# Patient Record
Sex: MALE | Race: WHITE | NOT HISPANIC OR LATINO | Employment: OTHER | URBAN - METROPOLITAN AREA
[De-identification: names, ages, dates, MRNs, and addresses within clinical notes are randomized per-mention and may not be internally consistent; named-entity substitution may affect disease eponyms.]

---

## 2017-02-10 ENCOUNTER — OFFICE VISIT (OUTPATIENT)
Dept: PULMONOLOGY | Facility: CLINIC | Age: 77
End: 2017-02-10
Payer: MEDICARE

## 2017-02-10 ENCOUNTER — TELEPHONE (OUTPATIENT)
Dept: PULMONOLOGY | Facility: CLINIC | Age: 77
End: 2017-02-10

## 2017-02-10 ENCOUNTER — HOSPITAL ENCOUNTER (OUTPATIENT)
Dept: RADIOLOGY | Facility: HOSPITAL | Age: 77
Discharge: HOME | End: 2017-02-10
Attending: INTERNAL MEDICINE
Payer: MEDICARE

## 2017-02-10 VITALS
WEIGHT: 152 LBS | OXYGEN SATURATION: 87 % | BODY MASS INDEX: 21.76 KG/M2 | HEIGHT: 70 IN | HEART RATE: 90 BPM | SYSTOLIC BLOOD PRESSURE: 115 MMHG | DIASTOLIC BLOOD PRESSURE: 78 MMHG

## 2017-02-10 DIAGNOSIS — J96.11 CHRONIC RESPIRATORY FAILURE WITH HYPOXIA: ICD-10-CM

## 2017-02-10 DIAGNOSIS — J84.10 PULMONARY FIBROSIS: ICD-10-CM

## 2017-02-10 DIAGNOSIS — K59.03 DRUG-INDUCED CONSTIPATION: Primary | ICD-10-CM

## 2017-02-10 DIAGNOSIS — J47.9 BRONCHIECTASIS WITHOUT COMPLICATION: ICD-10-CM

## 2017-02-10 DIAGNOSIS — S22.000A CLOSED COMPRESSION FRACTURE OF THORACIC VERTEBRA, INITIAL ENCOUNTER: ICD-10-CM

## 2017-02-10 PROCEDURE — 99999 PR PBB SHADOW E&M-EST. PATIENT-LVL IV: CPT | Mod: PBBFAC,,, | Performed by: INTERNAL MEDICINE

## 2017-02-10 PROCEDURE — 71020 XR CHEST PA AND LATERAL: CPT | Mod: TC

## 2017-02-10 PROCEDURE — 99214 OFFICE O/P EST MOD 30 MIN: CPT | Mod: PBBFAC,PO | Performed by: INTERNAL MEDICINE

## 2017-02-10 PROCEDURE — 99214 OFFICE O/P EST MOD 30 MIN: CPT | Mod: S$PBB,,, | Performed by: INTERNAL MEDICINE

## 2017-02-10 PROCEDURE — 71020 XR CHEST PA AND LATERAL: CPT | Mod: 26,,, | Performed by: RADIOLOGY

## 2017-02-10 RX ORDER — LACTULOSE 10 G/15ML
10 SOLUTION ORAL 2 TIMES DAILY
Qty: 300 ML | Refills: 11 | Status: SHIPPED | OUTPATIENT
Start: 2017-02-10 | End: 2017-02-20

## 2017-02-10 RX ORDER — HYDROCODONE BITARTRATE AND ACETAMINOPHEN 5; 325 MG/1; MG/1
1 TABLET ORAL EVERY 6 HOURS PRN
Qty: 100 TABLET | Refills: 0 | Status: SHIPPED | OUTPATIENT
Start: 2017-02-10

## 2017-02-10 RX ORDER — POLYETHYLENE GLYCOL 3350 17 G/17G
17 POWDER, FOR SOLUTION ORAL DAILY
Qty: 30 PACKET | Refills: 11 | Status: SHIPPED | OUTPATIENT
Start: 2017-02-10

## 2017-02-10 RX ORDER — MORPHINE SULFATE 15 MG/1
15 TABLET ORAL EVERY 4 HOURS PRN
Qty: 90 TABLET | Refills: 0 | Status: SHIPPED | OUTPATIENT
Start: 2017-02-10

## 2017-02-10 RX ORDER — MORPHINE SULFATE 30 MG/1
30 CAPSULE, EXTENDED RELEASE ORAL DAILY
Qty: 30 CAPSULE | Refills: 0 | Status: SHIPPED | OUTPATIENT
Start: 2017-02-10

## 2017-02-10 RX ORDER — ALBUTEROL SULFATE 90 UG/1
AEROSOL, METERED RESPIRATORY (INHALATION)
Qty: 1 INHALER | Refills: 11 | Status: SHIPPED | OUTPATIENT
Start: 2017-02-10

## 2017-02-10 RX ORDER — LUBIPROSTONE 24 UG/1
24 CAPSULE ORAL 2 TIMES DAILY WITH MEALS
Qty: 60 CAPSULE | Refills: 2 | Status: SHIPPED | OUTPATIENT
Start: 2017-02-10

## 2017-02-10 NOTE — PROGRESS NOTES
"2/10/2017    Pablito Ferrer  Office Note    Chief Complaint   Patient presents with    Follow-up       Feb 10, 2017, has extremis with with bm with sats very low, needs to use niv for bm.  Very constipated. Uses laxatives.  High dahlia o2 and niv for extreme sob/air hunger. Uses morphine once daily with occasional extra later with great results and no apparent side effects.   No recent weight.         Nov 10, 2016HPI: doing well with steroid and morphine, uses 02 24/7- 7-9 lpm.  Uses morphine and has good response- 15mg msir in am allows pt to get out of bed, later in day doesn't do as well.  Uses narco some days 1- 2 days, bowels are good with occ stool softener need.  Compression fx pain lingering and needs pain rx- pain may be worsening- pain 5-6/10.  Cough moderate but not violent. Mucous cream colored- no abx recent.  Has sq cell resect form lip.  Fluid retention with need for diuretic every other day.        The chief compliant  problem is "stable but needs high o2 and narcotics.  PFSH:  Past Medical History   Diagnosis Date    Acute exacerbation of bronchiectasis 7/15/2016    Chronic bronchitis     Coronary artery disease     Pneumonia     Pulmonary fibrosis     Rash          Past Surgical History   Procedure Laterality Date    Coronary artery bypass graft      Cardiac catheterization      Inguinal hernia repair Bilateral 2015    Hand surgery Right 1985     Social History   Substance Use Topics    Smoking status: Former Smoker     Types: Pipe    Smokeless tobacco: Never Used    Alcohol use No     Family History   Problem Relation Age of Onset    Cancer Mother     Heart failure Father     Cancer Sister     No Known Problems Brother     No Known Problems Maternal Aunt     No Known Problems Maternal Uncle     No Known Problems Paternal Aunt     No Known Problems Paternal Uncle     Heart failure Maternal Grandmother     Heart failure Maternal Grandfather     No Known Problems Paternal " "Grandmother     No Known Problems Paternal Grandfather      Review of patient's allergies indicates:   Allergen Reactions    Isosorbide Other (See Comments)     headache  headache    Isosorbide mononitrate Other (See Comments)     headache       Performance Status:The patient's activity level is mobility with asit devices.      Review of Systems:  a review of eleven systems covering constitutional, Eye, HEENT, Psych, Respiratory, Cardiac, GI, , Musculoskeletal, Endocrine, Dermatologic was negative except for pertinent findings as listed ABOVE and below:  All good except as above.     Exam:Comprehensive exam done.   Visit Vitals    /78 (BP Location: Right arm, Patient Position: Sitting, BP Method: Automatic)    Pulse 90    Ht 5' 10" (1.778 m)    Wt 68.9 kg (152 lb)    SpO2 (!) 87%    BMI 21.81 kg/m2     Exam included Vitals as listed, and patient's appearance and affect and alertness and mood, oral exam for yeast and hygiene and pharynx lesions and Mallapatti (M) score, neck with inspection for jvd and masses and thyroid abnormalities and lymph nodes (supraclavicular and infraclavicular nodes and axillary also examined and noted if abn), chest exam included symmetry and effort and fremitus and percussion and auscultation, cardiac exam included rhythm and gallops and murmur and rubs and jvd and edema, abdominal exam for mass and hepatosplenomegaly and tenderness and hernias and bowel sounds, Musculoskeletal exam with muscle tone and posture and mobility/gait and  strength, and skin for rashes and cyanosis and pallor and turgor, extremity for clubbing.  Findings were normal except for pertinent findings listed below:  M2 and posterior rales 2/3 up, rale below clavicles anterior.  No edema, freq cough.  Looks much worse    Radiographs (ct chest and cxr) reviewed: cxr with much worse fibrosis    Labs reviewed with no issues.           Plan:  Clinical impression is resonably certain and repeated " evaluation prn +/- follow up will be needed as below.    Pablito was seen today for follow-up.    Diagnoses and all orders for this visit:    Drug-induced constipation  -     polyethylene glycol (GLYCOLAX) 17 gram PwPk; Take 17 g by mouth once daily.  -     lactulose (CHRONULAC) 20 gram/30 mL Soln; Take 15 mLs (10 g total) by mouth 2 (two) times daily.  -     lubiprostone (AMITIZA) 24 MCG Cap; Take 1 capsule (24 mcg total) by mouth 2 (two) times daily with meals.    Pulmonary fibrosis  -     nintedanib (OFEV) 100 mg Cap; Take 1 capsule by mouth 2 (two) times daily.  -     morphine (MSIR) 15 MG tablet; Take 1 tablet (15 mg total) by mouth every 4 (four) hours as needed for Pain (breathlessness).  -     hydrocodone-acetaminophen 5-325mg (NORCO) 5-325 mg per tablet; Take 1 tablet by mouth every 6 (six) hours as needed (cough or pain or shortness of breath.).  -     polyethylene glycol (GLYCOLAX) 17 gram PwPk; Take 17 g by mouth once daily.  -     lactulose (CHRONULAC) 20 gram/30 mL Soln; Take 15 mLs (10 g total) by mouth 2 (two) times daily.  -     morphine (AVINZA) 30 MG 24 hr capsule; Take 1 capsule (30 mg total) by mouth once daily.    Closed compression fracture of thoracic vertebra, initial encounter  -     morphine (MSIR) 15 MG tablet; Take 1 tablet (15 mg total) by mouth every 4 (four) hours as needed for Pain (breathlessness).  -     hydrocodone-acetaminophen 5-325mg (NORCO) 5-325 mg per tablet; Take 1 tablet by mouth every 6 (six) hours as needed (cough or pain or shortness of breath.).  -     morphine (AVINZA) 30 MG 24 hr capsule; Take 1 capsule (30 mg total) by mouth once daily.    Bronchiectasis without complication  -     morphine (MSIR) 15 MG tablet; Take 1 tablet (15 mg total) by mouth every 4 (four) hours as needed for Pain (breathlessness).  -     hydrocodone-acetaminophen 5-325mg (NORCO) 5-325 mg per tablet; Take 1 tablet by mouth every 6 (six) hours as needed (cough or pain or shortness of breath.).  -      albuterol 90 mcg/actuation inhaler; 2 puffs every 4 hours as needed for cough, wheeze, or shortness of breath  unable to take fosamax solution.    No Follow-up on file.    Discussed with patient above for education the following:       May need to call to arrange morphine and narco.    levaquin for yg mucous.    May need to consider more ofev dose next visit.    Check blood now.    Check xray and blood 3 months.

## 2017-02-10 NOTE — PATIENT INSTRUCTIONS
Fibrosis is worse and would be best managed by staying home with morphine.    24 hr morphine daily at 30 mg and use quick acting as needed. May use norco also.    For constipation use amitizma twice or once a day, use lactulose and miralax.  Will need more narcotics and more laxative.    oximizer pendant would help.     Hospice may be good to assure will get narcotics and therapy adjusted as needed.      Follow by phone otherwise.

## 2017-02-10 NOTE — TELEPHONE ENCOUNTER
----- Message from Akanksha Mcelroy sent at 2/10/2017  1:59 PM CST -----  Contact: Alirio Amezquita with [facility] [phone] is calling to check the quantity of the Lactulose/please advise

## 2017-02-10 NOTE — MR AVS SNAPSHOT
[facility]  [address]  [place] [address]  Phone: [phone]                  Pablito Ferrer   2/10/2017 1:00 PM   Office Visit    Description:  Male : 1940   Provider:  Barrie Bowles MD   Department:  [facility]           Reason for Visit     Follow-up           Diagnoses this Visit        Comments    Drug-induced constipation    -  Primary     Pulmonary fibrosis         Closed compression fracture of thoracic vertebra, initial encounter         Bronchiectasis without complication                To Do List           Goals (5 Years of Data)     None      Follow-Up and Disposition     Return if symptoms worsen or fail to improve.    Follow-up and Disposition History       These Medications        Disp Refills Start End    nintedanib (OFEV) 100 mg Cap 60 capsule 11 2/10/2017     Take 1 capsule by mouth 2 (two) times daily. - Oral    [facility] [address] [place] [address] [facility] [address] [facility] Ph #: [phone]       morphine (MSIR) 15 MG tablet 90 tablet 0 2/10/2017     Take 1 tablet (15 mg total) by mouth every 4 (four) hours as needed for Pain (breathlessness). - Oral    Pharmacy: [facility] [address] [place] [address] [address] AT [facility] Ph #: [phone]       hydrocodone-acetaminophen 5-325mg (NORCO) 5-325 mg per tablet 100 tablet 0 2/10/2017     Take 1 tablet by mouth every 6 (six) hours as needed (cough or pain or shortness of breath.). - Oral    Pharmacy: [facility] [address] [place] [address] [facility] [address] [facility] Ph #: [phone]       polyethylene glycol (GLYCOLAX) 17 gram PwPk 30 packet 11 2/10/2017     Take 17 g by mouth once daily. - Oral    Pharmacy: [facility] [address] [place] [address] [facility] [address] [facility] Ph #: [phone]       lactulose (CHRONULAC) 20 gram/30 mL  Soln 300 mL 11 2/10/2017 2/20/2017    Take 15 mLs (10 g total) by mouth 2 (two) times daily. - Oral    Pharmacy: [facility] [address] [place] [address] Ph #: [phone]       lubiprostone (AMITIZA) 24 MCG Cap 60 capsule 2 2/10/2017     Take 1 capsule (24 mcg total) by mouth 2 (two) times daily with meals. - Oral    Pharmacy: [facility] [address] [place] [address] Ph #: [phone]       morphine (AVINZA) 30 MG 24 hr capsule 30 capsule 0 2/10/2017     Take 1 capsule (30 mg total) by mouth once daily. - Oral    Pharmacy: [facility] [address] [place] [address] Ph #: [phone]       albuterol 90 mcg/actuation inhaler 1 Inhaler 11 2/10/2017     2 puffs every 4 hours as needed for cough, wheeze, or shortness of breath    Pharmacy: [facility] [address] [place] [address] Ph #: [phone]         [facility]     [facility] - 24/7 Assistance  Registered nurses in the [facility] provide clinical advisement, health education, appointment booking, and other advisory services.  Call for this free service at [phone].             Medications           Message regarding Medications     Verify the changes and/or additions to your medication regime listed below are the same as discussed with your clinician today.  If any of these changes or additions are incorrect, please notify your healthcare provider.        START taking these NEW medications        Refills    polyethylene glycol (GLYCOLAX) 17 gram PwPk 11    Sig: Take 17 g by mouth once daily.    Class: Normal    Route: Oral    lactulose (CHRONULAC) 20 gram/30 mL Soln 11    Sig: Take 15 mLs (10 g total) by mouth 2 (two) times daily.    Class: Normal    Route: Oral    lubiprostone (AMITIZA) 24  MCG Cap 2    Sig: Take 1 capsule (24 mcg total) by mouth 2 (two) times daily with meals.    Class: Normal    Route: Oral    morphine (AVINZA) 30 MG 24 hr capsule 0    Sig: Take 1 capsule (30 mg total) by mouth once daily.    Class: Print    Route: Oral    albuterol 90 mcg/actuation inhaler 11    Si puffs every 4 hours as needed for cough, wheeze, or shortness of breath    Class: Print      CHANGE how you are taking these medications     Start Taking Instead of    morphine (MSIR) 15 MG tablet morphine (MSIR) 15 MG tablet    Dosage:  Take 1 tablet (15 mg total) by mouth every 4 (four) hours as needed for Pain (breathlessness). Dosage:  Take 1 tablet (15 mg total) by mouth 2 (two) times daily as needed for Pain (breathlessness).    Reason for Change:  Reorder       STOP taking these medications     clopidogrel (PLAVIX) 75 mg tablet Take 75 mg by mouth once daily.    cycloSPORINE (RESTASIS) 0.05 % ophthalmic emulsion Place 0.4 mLs (1 drop total) into both eyes 2 (two) times daily.    mupirocin (BACTROBAN) 2 % ointment 2 Tubes by Nasal route as needed.           Verify that the below list of medications is an accurate representation of the medications you are currently taking.  If none reported, the list may be blank. If incorrect, please contact your healthcare provider. Carry this list with you in case of emergency.           Current Medications     albuterol (PROVENTIL) 2.5 mg /3 mL (0.083 %) nebulizer solution Take 2.5 mg by nebulization 4 (four) times daily as needed.    albuterol 90 mcg/actuation inhaler 2 puffs every 4 hours as needed for cough, wheeze, or shortness of breath    alendronate (FOSAMAX) 70 MG tablet Take 1 tablet (70 mg total) by mouth every 7 days.    alprazolam (XANAX) 0.25 MG tablet Take 0.25 mg by mouth as needed.    furosemide (LASIX) 40 MG tablet Take 40 mg by mouth once daily.    gabapentin (NEURONTIN) 300 MG capsule Take 1 capsule (300 mg total) by mouth every evening.     "hydrocodone-acetaminophen 5-325mg (NORCO) 5-325 mg per tablet Take 1 tablet by mouth every 6 (six) hours as needed (cough or pain or shortness of breath.).    lactulose (CHRONULAC) 20 gram/30 mL Soln Take 15 mLs (10 g total) by mouth 2 (two) times daily.    lubiprostone (AMITIZA) 24 MCG Cap Take 1 capsule (24 mcg total) by mouth 2 (two) times daily with meals.    metoprolol succinate (TOPROL-XL) 25 MG 24 hr tablet Take 0.5 tablets (12.5 mg total) by mouth once daily.    morphine (AVINZA) 30 MG 24 hr capsule Take 1 capsule (30 mg total) by mouth once daily.    morphine (MSIR) 15 MG tablet Take 1 tablet (15 mg total) by mouth every 4 (four) hours as needed for Pain (breathlessness).    nintedanib (OFEV) 100 mg Cap Take 1 capsule by mouth 2 (two) times daily.    OXYGEN-AIR DELIVERY SYSTEMS MISC 10 L/min by Nasal route.    pantoprazole (PROTONIX) 40 MG tablet Take 1 tablet (40 mg total) by mouth once daily.    polyethylene glycol (GLYCOLAX) 17 gram PwPk Take 17 g by mouth once daily.    predniSONE (DELTASONE) 10 MG tablet Take 10 mg by mouth once daily.           Clinical Reference Information           Your Vitals Were     BP Pulse Height Weight SpO2 BMI    115/78 (BP Location: Right arm, Patient Position: Sitting, BP Method: Automatic) 90 5' 10" (1.778 m) 68.9 kg (152 lb) 87% 21.81 kg/m2      Blood Pressure          Most Recent Value    BP  115/78      Allergies as of 2/10/2017     Isosorbide    Isosorbide Mononitrate      Immunizations Administered on Date of Encounter - 2/10/2017     None      Instructions    Fibrosis is worse and would be best managed by staying home with morphine.    24 hr morphine daily at 30 mg and use quick acting as needed. May use norco also.    For constipation use amitizma twice or once a day, use lactulose and miralax.  Will need more narcotics and more laxative.    oximizer pendant would help.     Hospice may be good to assure will get narcotics and therapy adjusted as needed.      Follow " by phone otherwise.       Language Assistance Services     ATTENTION: Language assistance services are available, free of charge. Please call [phone].      ATENCIÓN: Si habla astrid, tiene a kathleen disposición servicios gratuitos de asistencia lingüística. Llame al [phone].     CHÚ Ý: N?u b?n nói Ti?ng Vi?t, có các d?ch v? h? tr? ngôn ng? mi?n phí dành cho b?n. G?i s? [phone].         [facility] complies with applicable Federal civil rights laws and does not discriminate on the basis of race, color, national origin, age, disability, or sex.

## 2017-04-11 ENCOUNTER — TELEPHONE (OUTPATIENT)
Dept: PULMONOLOGY | Facility: CLINIC | Age: 77
End: 2017-04-11
